# Patient Record
Sex: MALE | Race: WHITE | Employment: UNEMPLOYED | URBAN - METROPOLITAN AREA
[De-identification: names, ages, dates, MRNs, and addresses within clinical notes are randomized per-mention and may not be internally consistent; named-entity substitution may affect disease eponyms.]

---

## 2018-11-29 ENCOUNTER — OFFICE VISIT (OUTPATIENT)
Dept: FAMILY MEDICINE CLINIC | Age: 48
End: 2018-11-29

## 2018-11-29 VITALS
DIASTOLIC BLOOD PRESSURE: 89 MMHG | OXYGEN SATURATION: 98 % | HEIGHT: 67 IN | WEIGHT: 208 LBS | BODY MASS INDEX: 32.65 KG/M2 | RESPIRATION RATE: 18 BRPM | SYSTOLIC BLOOD PRESSURE: 144 MMHG | HEART RATE: 82 BPM | TEMPERATURE: 97.5 F

## 2018-11-29 DIAGNOSIS — R53.82 CHRONIC FATIGUE: ICD-10-CM

## 2018-11-29 DIAGNOSIS — E66.9 OBESITY (BMI 30-39.9): ICD-10-CM

## 2018-11-29 DIAGNOSIS — Z79.899 HIGH RISK MEDICATION USE: ICD-10-CM

## 2018-11-29 DIAGNOSIS — R10.13 EPIGASTRIC PAIN: ICD-10-CM

## 2018-11-29 DIAGNOSIS — Z23 ENCOUNTER FOR IMMUNIZATION: ICD-10-CM

## 2018-11-29 DIAGNOSIS — E78.41 ELEVATED LIPOPROTEIN(A): ICD-10-CM

## 2018-11-29 DIAGNOSIS — Z00.00 PHYSICAL EXAM: Primary | ICD-10-CM

## 2018-11-29 RX ORDER — TRAZODONE HYDROCHLORIDE 150 MG/1
150 TABLET ORAL
COMMUNITY

## 2018-11-29 NOTE — PATIENT INSTRUCTIONS
Body Mass Index: Care Instructions  Your Care Instructions    Body mass index (BMI) can help you see if your weight is raising your risk for health problems. It uses a formula to compare how much you weigh with how tall you are. · A BMI lower than 18.5 is considered underweight. · A BMI between 18.5 and 24.9 is considered healthy. · A BMI between 25 and 29.9 is considered overweight. A BMI of 30 or higher is considered obese. If your BMI is in the normal range, it means that you have a lower risk for weight-related health problems. If your BMI is in the overweight or obese range, you may be at increased risk for weight-related health problems, such as high blood pressure, heart disease, stroke, arthritis or joint pain, and diabetes. If your BMI is in the underweight range, you may be at increased risk for health problems such as fatigue, lower protection (immunity) against illness, muscle loss, bone loss, hair loss, and hormone problems. BMI is just one measure of your risk for weight-related health problems. You may be at higher risk for health problems if you are not active, you eat an unhealthy diet, or you drink too much alcohol or use tobacco products. Follow-up care is a key part of your treatment and safety. Be sure to make and go to all appointments, and call your doctor if you are having problems. It's also a good idea to know your test results and keep a list of the medicines you take. How can you care for yourself at home? · Practice healthy eating habits. This includes eating plenty of fruits, vegetables, whole grains, lean protein, and low-fat dairy. · If your doctor recommends it, get more exercise. Walking is a good choice. Bit by bit, increase the amount you walk every day. Try for at least 30 minutes on most days of the week. · Do not smoke. Smoking can increase your risk for health problems. If you need help quitting, talk to your doctor about stop-smoking programs and medicines. These can increase your chances of quitting for good. · Limit alcohol to 2 drinks a day for men and 1 drink a day for women. Too much alcohol can cause health problems. If you have a BMI higher than 25  · Your doctor may do other tests to check your risk for weight-related health problems. This may include measuring the distance around your waist. A waist measurement of more than 40 inches in men or 35 inches in women can increase the risk of weight-related health problems. · Talk with your doctor about steps you can take to stay healthy or improve your health. You may need to make lifestyle changes to lose weight and stay healthy, such as changing your diet and getting regular exercise. If you have a BMI lower than 18.5  · Your doctor may do other tests to check your risk for health problems. · Talk with your doctor about steps you can take to stay healthy or improve your health. You may need to make lifestyle changes to gain or maintain weight and stay healthy, such as getting more healthy foods in your diet and doing exercises to build muscle. Where can you learn more? Go to http://carly-avis.info/. Enter S176 in the search box to learn more about \"Body Mass Index: Care Instructions. \"  Current as of: October 13, 2016  Content Version: 11.4  © 6543-9217 [employer]. Care instructions adapted under license by [employer] (which disclaims liability or warranty for this information). If you have questions about a medical condition or this instruction, always ask your healthcare professional. [place] [address] any warranty or liability for your use of this information. Gastroesophageal Reflux Disease (GERD): Care Instructions  Your Care Instructions    Gastroesophageal reflux disease (GERD) is the backward flow of stomach acid into the esophagus. The esophagus is the tube that leads from your throat to your stomach.  A one-way valve prevents the stomach acid from moving up into this tube. When you have GERD, this valve does not close tightly enough. If you have mild GERD symptoms including heartburn, you may be able to control the problem with antacids or over-the-counter medicine. Changing your diet, losing weight, and making other lifestyle changes can also help reduce symptoms. Follow-up care is a key part of your treatment and safety. Be sure to make and go to all appointments, and call your doctor if you are having problems. It's also a good idea to know your test results and keep a list of the medicines you take. How can you care for yourself at home? · Take your medicines exactly as prescribed. Call your doctor if you think you are having a problem with your medicine. · Your doctor may recommend over-the-counter medicine. For mild or occasional indigestion, antacids, such as Tums, Gaviscon, Mylanta, or Maalox, may help. Your doctor also may recommend over-the-counter acid reducers, such as Pepcid AC, Tagamet HB, Zantac 75, or Prilosec. Read and follow all instructions on the label. If you use these medicines often, talk with your doctor. · Change your eating habits. ? It's best to eat several small meals instead of two or three large meals. ? After you eat, wait 2 to 3 hours before you lie down. ? Chocolate, mint, and alcohol can make GERD worse. ? Spicy foods, foods that have a lot of acid (like tomatoes and oranges), and coffee can make GERD symptoms worse in some people. If your symptoms are worse after you eat a certain food, you may want to stop eating that food to see if your symptoms get better. · Do not smoke or chew tobacco. Smoking can make GERD worse. If you need help quitting, talk to your doctor about stop-smoking programs and medicines. These can increase your chances of quitting for good.   · If you have GERD symptoms at night, raise the head of your bed 6 to 8 inches by putting the frame on blocks or placing a foam wedge under the head of your mattress. (Adding extra pillows does not work.)  · Do not wear tight clothing around your middle. · Lose weight if you need to. Losing just 5 to 10 pounds can help. When should you call for help? Call your doctor now or seek immediate medical care if:    · You have new or different belly pain.     · Your stools are black and tarlike or have streaks of blood.    Watch closely for changes in your health, and be sure to contact your doctor if:    · Your symptoms have not improved after 2 days.     · Food seems to catch in your throat or chest.   Where can you learn more? Go to http://carly-avis.info/. Enter S094 in the search box to learn more about \"Gastroesophageal Reflux Disease (GERD): Care Instructions. \"  Current as of: March 28, 2018  Content Version: 11.8  © 6071-9986 [employer]. Care instructions adapted under license by [employer] (which disclaims liability or warranty for this information). If you have questions about a medical condition or this instruction, always ask your healthcare professional. [address] any warranty or liability for your use of this information.

## 2018-11-29 NOTE — PROGRESS NOTES
Chief Complaint   Patient presents with   Royetta Perches Establish Care    Abdominal Pain     Epigastric pain for 2 weeks with diarrhea that lasted 1 week     1. Have you been to the ER, urgent care clinic since your last visit? Hospitalized since your last visit? No    2. Have you seen or consulted any other health care providers outside of the [address] [place] [address] since your last visit? Include any pap smears or colon screening. [facility]     After obtaining consent, and per orders of [facility], injection of flu vaccine 0.5mL given IM in right deltoid by Casey Diaz LPN. Patient instructed to remain in clinic for 20 minutes afterwards, and to report any adverse reaction to me immediately.

## 2018-11-29 NOTE — PROGRESS NOTES
HISTORY OF PRESENT ILLNESS  Js tSewart is a 53 y.o. male here today to establish care. Patient has past medical history of:  Past Medical History:   Diagnosis Date    Hypercholesterolemia     Paranoid schizophrenia ([address]     Psychiatric disorder     hearing voices     Paranoid schizophrenia:  [facility]- patient has been seeing them for 5 years, only recently started the invega injections. Patient is tolerating these medicatoins well. Insomnia- patient takes trazodone at night for sleep. Patient states that it works well for him-has only been taking for 1 month. Abdominal pain- 2 weeks ago- diarrhea x 1 day, resolved. Abdominal pain in the epigastric area- patient denies nausea, vomiting. Diet- patient has reduced dietary intake. Breakfast- oatmeal/grits, toas, lunch- sandwich- ham and cheese, dinner- meat with vegetables. Abdominal pain- intermittent, 8/10 at its worst- lasts for an hour, has tried pepto, immodium- didn't help abdominal pain, helped diarrhea. Last BM- normal, no melena, nothing makes the pain worse/better  Denies chest pain, shortness of breath, palpitations. Patient has never had this in the past.     Review of Systems   Constitutional: Negative for chills, diaphoresis, fever, malaise/fatigue and weight loss. HENT: Negative for congestion, ear pain, hearing loss, nosebleeds, sinus pain, sore throat and tinnitus. Eyes: Negative for blurred vision, double vision, pain and discharge. Respiratory: Negative for cough, hemoptysis, shortness of breath and wheezing. Cardiovascular: Negative for chest pain, palpitations, orthopnea, claudication, leg swelling and PND. Gastrointestinal: Positive for abdominal pain. Negative for blood in stool, constipation, diarrhea, heartburn, melena, nausea and vomiting. Genitourinary: Negative for dysuria, flank pain, frequency, hematuria and urgency.    Musculoskeletal: Negative for back pain, joint pain, myalgias and neck pain. Skin: Negative for itching and rash. Neurological: Negative for dizziness, tingling, tremors, speech change, focal weakness, seizures, weakness and headaches. Endo/Heme/Allergies: Negative for polydipsia. Does not bruise/bleed easily. Psychiatric/Behavioral: Positive for hallucinations. Negative for depression, memory loss, substance abuse and suicidal ideas. The patient is not nervous/anxious and does not have insomnia. Physical Exam   Constitutional: He is oriented to person, place, and time. He appears well-developed and well-nourished. HENT:   Head: Normocephalic and atraumatic. Right Ear: External ear normal.   Left Ear: External ear normal.   Mouth/Throat: Oropharynx is clear and moist.   Eyes: Conjunctivae are normal. Pupils are equal, round, and reactive to light. Neck: Normal range of motion. Neck supple. Cardiovascular: Normal rate, regular rhythm, normal heart sounds and intact distal pulses. Pulmonary/Chest: Effort normal and breath sounds normal.   Abdominal: Soft. Bowel sounds are normal. He exhibits no distension and no mass. There is no tenderness. There is no rebound and no guarding. Musculoskeletal: Normal range of motion. Neurological: He is alert and oriented to person, place, and time. Skin: Skin is warm and dry. Psychiatric: He has a normal mood and affect. His behavior is normal. Judgment and thought content normal.   Nursing note and vitals reviewed. ASSESSMENT and PLAN  Diagnoses and all orders for this visit:    1. Physical exam  -     CBC WITH AUTOMATED DIFF; Future  -     T4, FREE; Future  -     IRON PROFILE; Future  -     VITAMIN D, 1, 25 DIHYDROXY; Future    2. High risk medication use  -     AMB POC EKG ROUTINE W/ 12 LEADS, INTER & REP  -     CBC WITH AUTOMATED DIFF; Future  -     METABOLIC PANEL, COMPREHENSIVE; Future  -     TSH 3RD GENERATION; Future  -     VITAMIN B12; Future  -     IRON PROFILE; Future    3.  Encounter for immunization  -     INFLUENZA VIRUS VAC QUAD,SPLIT,PRESV FREE SYRINGE IM    4. Elevated lipoprotein(a)  -     LIPID PANEL; Future  -     TSH 3RD GENERATION; Future  -     VITAMIN B12; Future    5. Epigastric pain  -     IRON PROFILE; Future  -     VITAMIN D, 1, 25 DIHYDROXY; Future  -     H. PYLORI BREATH TEST; Future        -    Prilosec 20mg one cap po q day. - Advised patient to have his labs done prior to starting prilosec in order to get an accurate measurement of H. Pylori. 6. Chronic fatigue    7. Obesity (BMI 30-39. 9)    Discussed the patient's BMI with him. The BMI follow up plan is as follows:     dietary management education, guidance, and counseling  encourage exercise  monitor weight  prescribed dietary intake     An After Visit Summary was printed and given to the patient.

## 2018-12-03 ENCOUNTER — TELEPHONE (OUTPATIENT)
Dept: FAMILY MEDICINE CLINIC | Age: 48
End: 2018-12-03

## 2018-12-03 RX ORDER — PHENOL/SODIUM PHENOLATE
20 AEROSOL, SPRAY (ML) MUCOUS MEMBRANE DAILY
Qty: 30 TAB | Refills: 2 | Status: SHIPPED | OUTPATIENT
Start: 2018-12-03

## 2018-12-03 NOTE — TELEPHONE ENCOUNTER
Pt lvm that Rx request was not at pharmacy. Pt did not specify which Rx. Pt was last seen on 12/27/18.

## 2018-12-03 NOTE — TELEPHONE ENCOUNTER
Patient states that he needs RX for acid reflux sent to pharmacy as per his discussion with provider at his visit on 11/29/2018. Patient would like RX sent to [facility] in [place]. Routed to provider for review.

## 2018-12-03 NOTE — TELEPHONE ENCOUNTER
Patient informed that Prilosec was sent to pharmacy but to have labs done prior to starting medication. Patient verbalized understanding.

## 2018-12-03 NOTE — TELEPHONE ENCOUNTER
I sent in the Prilosec medication for the patient. Please let him know that he needs to have his labs done prior to starting this medication. Thanks much!

## 2018-12-06 LAB
1,25(OH)2D3 SERPL-MCNC: 39.8 PG/ML (ref 19.9–79.3)
ALBUMIN SERPL-MCNC: 4.6 G/DL (ref 3.5–5.5)
ALBUMIN/GLOB SERPL: 1.8 {RATIO} (ref 1.2–2.2)
ALP SERPL-CCNC: 48 IU/L (ref 39–117)
ALT SERPL-CCNC: 22 IU/L (ref 0–44)
AST SERPL-CCNC: 21 IU/L (ref 0–40)
BASOPHILS # BLD AUTO: 0.1 X10E3/UL (ref 0–0.2)
BASOPHILS NFR BLD AUTO: 1 %
BILIRUB SERPL-MCNC: 0.3 MG/DL (ref 0–1.2)
BUN SERPL-MCNC: 9 MG/DL (ref 6–24)
BUN/CREAT SERPL: 10 (ref 9–20)
CALCIUM SERPL-MCNC: 9.2 MG/DL (ref 8.7–10.2)
CHLORIDE SERPL-SCNC: 99 MMOL/L (ref 96–106)
CHOLEST SERPL-MCNC: 213 MG/DL (ref 100–199)
CO2 SERPL-SCNC: 24 MMOL/L (ref 20–29)
CREAT SERPL-MCNC: 0.92 MG/DL (ref 0.76–1.27)
EOSINOPHIL # BLD AUTO: 0.2 X10E3/UL (ref 0–0.4)
EOSINOPHIL NFR BLD AUTO: 2 %
ERYTHROCYTE [DISTWIDTH] IN BLOOD BY AUTOMATED COUNT: 13.4 % (ref 12.3–15.4)
GLOBULIN SER CALC-MCNC: 2.5 G/DL (ref 1.5–4.5)
GLUCOSE SERPL-MCNC: 98 MG/DL (ref 65–99)
HCT VFR BLD AUTO: 45.7 % (ref 37.5–51)
HDLC SERPL-MCNC: 29 MG/DL
HGB BLD-MCNC: 15.1 G/DL (ref 13–17.7)
IMM GRANULOCYTES # BLD: 0 X10E3/UL (ref 0–0.1)
IMM GRANULOCYTES NFR BLD: 0 %
IRON SATN MFR SERPL: 25 % (ref 15–55)
IRON SERPL-MCNC: 80 UG/DL (ref 38–169)
LDLC SERPL CALC-MCNC: 137 MG/DL (ref 0–99)
LYMPHOCYTES # BLD AUTO: 3.3 X10E3/UL (ref 0.7–3.1)
LYMPHOCYTES NFR BLD AUTO: 27 %
MCH RBC QN AUTO: 30.8 PG (ref 26.6–33)
MCHC RBC AUTO-ENTMCNC: 33 G/DL (ref 31.5–35.7)
MCV RBC AUTO: 93 FL (ref 79–97)
MONOCYTES # BLD AUTO: 0.6 X10E3/UL (ref 0.1–0.9)
MONOCYTES NFR BLD AUTO: 5 %
MORPHOLOGY BLD-IMP: ABNORMAL
NEUTROPHILS # BLD AUTO: 8.3 X10E3/UL (ref 1.4–7)
NEUTROPHILS NFR BLD AUTO: 65 %
PLATELET # BLD AUTO: 197 X10E3/UL (ref 150–379)
POTASSIUM SERPL-SCNC: 4.5 MMOL/L (ref 3.5–5.2)
PROT SERPL-MCNC: 7.1 G/DL (ref 6–8.5)
RBC # BLD AUTO: 4.9 X10E6/UL (ref 4.14–5.8)
SODIUM SERPL-SCNC: 141 MMOL/L (ref 134–144)
T4 FREE SERPL-MCNC: 1.33 NG/DL (ref 0.82–1.77)
TIBC SERPL-MCNC: 323 UG/DL (ref 250–450)
TRIGL SERPL-MCNC: 233 MG/DL (ref 0–149)
TSH SERPL DL<=0.005 MIU/L-ACNC: 2.4 UIU/ML (ref 0.45–4.5)
UIBC SERPL-MCNC: 243 UG/DL (ref 111–343)
VIT B12 SERPL-MCNC: 476 PG/ML (ref 232–1245)
VLDLC SERPL CALC-MCNC: 47 MG/DL (ref 5–40)
WBC # BLD AUTO: 12.5 X10E3/UL (ref 3.4–10.8)

## 2018-12-10 ENCOUNTER — TELEPHONE (OUTPATIENT)
Dept: FAMILY MEDICINE CLINIC | Age: 48
End: 2018-12-10

## 2018-12-10 NOTE — TELEPHONE ENCOUNTER
LVM for patient to call back in regards to lab results. Will order CXR and repeat CBC 2. To elevated WBC'S.

## 2018-12-11 ENCOUNTER — TELEPHONE (OUTPATIENT)
Dept: FAMILY MEDICINE CLINIC | Age: 48
End: 2018-12-11

## 2018-12-11 NOTE — TELEPHONE ENCOUNTER
Spoke with patient regarding abnormal labs. Patient's WBC count was mildly elevated. Patient states he feels well, no concerning symptoms. Advised patient that we will repeat his WBC in 2-3 weeks when I see him. Also, discussed with patient his elevated cholesterol levels. Recommended low fat diet and exercise to improve cholesterol. Patient verbalized understanding. Patient did no have h.pylori test done as the lab did not perform. Patient started taking omeprazole and his epigastric pain is improving. Recommend patient continue taking this medication. Will re-evaluate when he sees me in a few weeks.

## 2018-12-27 ENCOUNTER — OFFICE VISIT (OUTPATIENT)
Dept: FAMILY MEDICINE CLINIC | Age: 48
End: 2018-12-27

## 2018-12-27 VITALS
BODY MASS INDEX: 33.06 KG/M2 | SYSTOLIC BLOOD PRESSURE: 130 MMHG | HEART RATE: 90 BPM | DIASTOLIC BLOOD PRESSURE: 83 MMHG | RESPIRATION RATE: 18 BRPM | HEIGHT: 67 IN | TEMPERATURE: 96.4 F | WEIGHT: 210.6 LBS | OXYGEN SATURATION: 97 %

## 2018-12-27 DIAGNOSIS — D72.829 LEUKOCYTOSIS, UNSPECIFIED TYPE: ICD-10-CM

## 2018-12-27 DIAGNOSIS — Z71.6 ENCOUNTER FOR SMOKING CESSATION COUNSELING: ICD-10-CM

## 2018-12-27 DIAGNOSIS — E78.2 MIXED HYPERLIPIDEMIA: ICD-10-CM

## 2018-12-27 DIAGNOSIS — K21.9 GASTROESOPHAGEAL REFLUX DISEASE WITHOUT ESOPHAGITIS: Primary | ICD-10-CM

## 2018-12-27 NOTE — PROGRESS NOTES
Chief Complaint   Patient presents with    Abdominal Pain     Patient stated pain is better. 1. Have you been to the ER, urgent care clinic since your last visit? Hospitalized since your last visit? No    2. Have you seen or consulted any other health care providers outside of the [address] [place] [address] since your last visit? Include any pap smears or colon screening.  No

## 2018-12-27 NOTE — PROGRESS NOTES
HISTORY OF PRESENT ILLNESS  Seamus Chaves is a 53 y.o. male here today for abdominal pain follow-up. Patient has been taking omeprazole as prescribed. He states he feels much better after taking the medication for the past few weeks. Patient is still smoking a pack of cigarettes a day. Patient denies any further abdominal pain, chest pain, palpitations, n/v/d. Review of Systems   Respiratory: Negative for cough, hemoptysis, sputum production, shortness of breath and wheezing. Gastrointestinal: Negative for abdominal pain, blood in stool, constipation, diarrhea, heartburn, melena, nausea and vomiting. Physical Exam   Constitutional: He is oriented to person, place, and time. He appears well-developed and well-nourished. Cardiovascular: Normal rate, regular rhythm, normal heart sounds and intact distal pulses. Pulmonary/Chest: Effort normal and breath sounds normal. No respiratory distress. Abdominal: Soft. Bowel sounds are normal.   Neurological: He is alert and oriented to person, place, and time. Skin: Skin is warm and dry. Visit Vitals  /83   Pulse 90   Temp 96.4 °F (35.8 °C) (Oral)   Resp 18   Ht 5' 7\" (1.702 m)   Wt 210 lb 9.6 oz (95.5 kg)   SpO2 97% Comment: ra   BMI 32.98 kg/m²       Lab review:  Component Value Flag Ref Range Units Status   Cholesterol, total 213 Abnormally high   H  100 - 199 mg/dL Final   Triglyceride 233 Abnormally high   H  0 - 149 mg/dL Final   HDL Cholesterol 29 Abnormally low   L  >39 mg/dL Final   VLDL, calculated 47 Abnormally high   H  5 - 40 mg/dL Final   LDL, calculated 137 Abnormally high   H  0 - 99 mg/dL Final     WBC 12.5 Abnormally high   H  3.4 - 10.8 x10E3/uL Final     ABS. NEUTROPHILS 8.3 Abnormally high   H  1.4 - 7.0 x10E3/uL Final   Abs Lymphocytes 3.3 Abnormally high   H  0.7 - 3.1 x10E3/uL Final     ASSESSMENT and PLAN  Diagnoses and all orders for this visit:    1.  Gastroesophageal reflux disease without esophagitis       - Continue with omeprazole until complete.        - follow--up if s/s of epigastric pain return. Will refer to GI as necessary. 2. Leukocytosis, unspecified type  -     CBC WITH AUTOMATED DIFF; Future        - Repeat CBC in 1 month. 3. Mixed hyperlipidemia/Smoking Cessation  -     LIPID PANEL; Future  Repeat in 6 months. -     Explained to patient that his Invega injections could be contributing to his elevated lipids. He also endorses a poor diet and smokes 1ppd. Advised patient to quit smoking. Discussed various smoking cessation including medication and nicotine replacement. Patient says he will think about it and start working on reducing smoking. He will let us know if we can be of further assistance. - Consider statin medication to treat lipids if no improvement in lipid panel in 6 months. Follow-up in 6 months for lab review.

## 2019-01-26 LAB
ABSOLUTE LYMPHOCYTE COUNT, 10803: 3 K/UL (ref 1–4.8)
BASOPHILS # BLD: 0 K/UL (ref 0–0.2)
BASOPHILS NFR BLD: 0 % (ref 0–2)
CHOLEST SERPL-MCNC: 219 MG/DL (ref 110–200)
EOSINOPHIL # BLD: 0.3 K/UL (ref 0–0.5)
EOSINOPHIL NFR BLD: 3 % (ref 0–6)
ERYTHROCYTE [DISTWIDTH] IN BLOOD BY AUTOMATED COUNT: 14.2 % (ref 10–15.5)
GRANULOCYTES,GRANS: 67 % (ref 40–75)
HCT VFR BLD AUTO: 46.5 % (ref 39.3–51.6)
HDLC SERPL-MCNC: 23 MG/DL (ref 40–59)
HDLC SERPL-MCNC: 9.5 MG/DL (ref 0–5)
HGB BLD-MCNC: 14.7 G/DL (ref 13.1–17.2)
LDLC SERPL CALC-MCNC: 119 MG/DL (ref 50–99)
LYMPHOCYTES, LYMLT: 26 % (ref 20–45)
MCH RBC QN AUTO: 32 PG (ref 26–34)
MCHC RBC AUTO-ENTMCNC: 32 G/DL (ref 31–36)
MCV RBC AUTO: 100 FL (ref 80–95)
MONOCYTES # BLD: 0.5 K/UL (ref 0.1–1)
MONOCYTES NFR BLD: 5 % (ref 3–12)
NEUTROPHILS # BLD AUTO: 7.5 K/UL (ref 1.8–7.7)
PLATELET # BLD AUTO: 179 K/UL (ref 140–440)
PMV BLD AUTO: 12.1 FL (ref 9–13)
RBC # BLD AUTO: 4.67 M/UL (ref 3.8–5.8)
TRIGL SERPL-MCNC: 389 MG/DL (ref 40–149)
VLDLC SERPL CALC-MCNC: 78 MG/DL (ref 8–30)
WBC # BLD AUTO: 11.3 K/UL (ref 4–11)

## 2019-02-22 ENCOUNTER — TELEPHONE (OUTPATIENT)
Dept: FAMILY MEDICINE CLINIC | Age: 49
End: 2019-02-22

## 2019-02-22 DIAGNOSIS — D72.829 LEUKOCYTOSIS, UNSPECIFIED TYPE: ICD-10-CM

## 2019-02-22 DIAGNOSIS — Z79.899 ON STATIN THERAPY: ICD-10-CM

## 2019-02-22 DIAGNOSIS — E78.2 MIXED HYPERLIPIDEMIA: Primary | ICD-10-CM

## 2019-02-22 RX ORDER — ROSUVASTATIN CALCIUM 10 MG/1
10 TABLET, COATED ORAL
Qty: 90 TAB | Refills: 0 | Status: SHIPPED | OUTPATIENT
Start: 2019-02-22 | End: 2019-05-01 | Stop reason: SDUPTHER

## 2019-02-22 NOTE — TELEPHONE ENCOUNTER
Spoke with patient about his labs. Explained to patient that his cholesterol levels were elevated. He does endorse eating that morning, but the cholesterol was elevated the previous time. Recommend starting a cholesterol medication. Patient is amenable. Advised him to come back in 4-6 weeks for LFT's. Will call in medication and order labs. Patient is amenable to both and verbalized understanding. Elana Recinos PA-C  [facility]  [phone]  [address]

## 2019-04-05 ENCOUNTER — LAB ONLY (OUTPATIENT)
Dept: FAMILY MEDICINE CLINIC | Age: 49
End: 2019-04-05

## 2019-04-05 ENCOUNTER — HOSPITAL ENCOUNTER (OUTPATIENT)
Dept: LAB | Age: 49
Discharge: HOME | End: 2019-04-05
Payer: MEDICAID

## 2019-04-05 DIAGNOSIS — Z79.899 ON STATIN THERAPY: ICD-10-CM

## 2019-04-05 DIAGNOSIS — Z79.899 HIGH RISK MEDICATION USE: Primary | ICD-10-CM

## 2019-04-05 DIAGNOSIS — D72.829 LEUKOCYTOSIS, UNSPECIFIED TYPE: ICD-10-CM

## 2019-04-05 LAB
ALBUMIN SERPL-MCNC: 4.1 G/DL (ref 3.4–5)
ALBUMIN/GLOB SERPL: 1.3 {RATIO} (ref 0.8–1.7)
ALP SERPL-CCNC: 50 U/L (ref 45–117)
ALT SERPL-CCNC: 40 U/L (ref 16–61)
AST SERPL-CCNC: 24 U/L (ref 15–37)
BASOPHILS # BLD: 0.1 K/UL (ref 0–0.1)
BASOPHILS NFR BLD: 0 % (ref 0–2)
BILIRUB DIRECT SERPL-MCNC: 0.1 MG/DL (ref 0–0.2)
BILIRUB SERPL-MCNC: 0.4 MG/DL (ref 0.2–1)
DIFFERENTIAL METHOD BLD: NORMAL
EOSINOPHIL # BLD: 0.3 K/UL (ref 0–0.4)
EOSINOPHIL NFR BLD: 3 % (ref 0–5)
ERYTHROCYTE [DISTWIDTH] IN BLOOD BY AUTOMATED COUNT: 13.7 % (ref 11.6–14.5)
GLOBULIN SER CALC-MCNC: 3.1 G/DL (ref 2–4)
HCT VFR BLD AUTO: 45.2 % (ref 36–48)
HGB BLD-MCNC: 14.9 G/DL (ref 13–16)
LYMPHOCYTES # BLD: 2.9 K/UL (ref 0.9–3.6)
LYMPHOCYTES NFR BLD: 26 % (ref 21–52)
MCH RBC QN AUTO: 31 PG (ref 24–34)
MCHC RBC AUTO-ENTMCNC: 33 G/DL (ref 31–37)
MCV RBC AUTO: 94 FL (ref 74–97)
MONOCYTES # BLD: 0.8 K/UL (ref 0.05–1.2)
MONOCYTES NFR BLD: 8 % (ref 3–10)
NEUTS SEG # BLD: 7 K/UL (ref 1.8–8)
NEUTS SEG NFR BLD: 63 % (ref 40–73)
PLATELET # BLD AUTO: 178 K/UL (ref 135–420)
PMV BLD AUTO: 11.8 FL (ref 9.2–11.8)
PROT SERPL-MCNC: 7.2 G/DL (ref 6.4–8.2)
RBC # BLD AUTO: 4.81 M/UL (ref 4.7–5.5)
WBC # BLD AUTO: 11.1 K/UL (ref 4.6–13.2)

## 2019-04-05 PROCEDURE — 80076 HEPATIC FUNCTION PANEL: CPT

## 2019-04-05 PROCEDURE — 85025 COMPLETE CBC W/AUTO DIFF WBC: CPT

## 2019-04-05 NOTE — PROGRESS NOTES
Patient presents for lab draw ordered by:    Ordering Provider:  Barry Cobian Department/Practice:  [facility]  Phone:  [phone]  Date Ordered:  3/2019    The following labs were drawn and sent to [facility]  by Isabel Go LPN:    CBC and Hepatic Function Panel    The following tubes were sent:     1 SST, 1Lav, 0Blue top, 0urine    Left AC cleansed using aseptic technique. Specimen obtained using a 21 gauge butterfly  with 1 attempt. Patient tolerated process well and there was no bleeding noted at site.

## 2019-04-06 ENCOUNTER — TELEPHONE (OUTPATIENT)
Dept: FAMILY MEDICINE CLINIC | Age: 49
End: 2019-04-06

## 2019-04-09 ENCOUNTER — TELEPHONE (OUTPATIENT)
Dept: FAMILY MEDICINE CLINIC | Age: 49
End: 2019-04-09

## 2019-05-01 DIAGNOSIS — E78.2 MIXED HYPERLIPIDEMIA: ICD-10-CM

## 2019-05-03 NOTE — TELEPHONE ENCOUNTER
Requested Prescriptions     Pending Prescriptions Disp Refills    rosuvastatin (CRESTOR) 10 mg tablet 90 Tab 0     Sig: Take 1 Tab by mouth nightly.

## 2019-05-05 RX ORDER — ROSUVASTATIN CALCIUM 10 MG/1
10 TABLET, COATED ORAL
Qty: 90 TAB | Refills: 0 | Status: SHIPPED | OUTPATIENT
Start: 2019-05-05 | End: 2019-08-15 | Stop reason: SDUPTHER

## 2019-06-03 ENCOUNTER — OFFICE VISIT (OUTPATIENT)
Dept: FAMILY MEDICINE CLINIC | Age: 49
End: 2019-06-03

## 2019-06-03 VITALS
RESPIRATION RATE: 16 BRPM | HEIGHT: 67 IN | HEART RATE: 82 BPM | BODY MASS INDEX: 32.65 KG/M2 | TEMPERATURE: 96.3 F | OXYGEN SATURATION: 94 % | SYSTOLIC BLOOD PRESSURE: 113 MMHG | WEIGHT: 208 LBS | DIASTOLIC BLOOD PRESSURE: 75 MMHG

## 2019-06-03 DIAGNOSIS — Z79.899 ON STATIN THERAPY: ICD-10-CM

## 2019-06-03 DIAGNOSIS — E66.9 OBESITY (BMI 30-39.9): ICD-10-CM

## 2019-06-03 DIAGNOSIS — K21.9 GASTROESOPHAGEAL REFLUX DISEASE WITHOUT ESOPHAGITIS: ICD-10-CM

## 2019-06-03 DIAGNOSIS — E78.2 MIXED HYPERLIPIDEMIA: Primary | ICD-10-CM

## 2019-06-03 DIAGNOSIS — D72.829 LEUKOCYTOSIS, UNSPECIFIED TYPE: ICD-10-CM

## 2019-06-03 RX ORDER — BENZTROPINE MESYLATE 0.5 MG/1
1 TABLET ORAL
COMMUNITY

## 2019-06-03 NOTE — PROGRESS NOTES
Chief Complaint   Patient presents with    Results     labs     1. Have you been to the ER, urgent care clinic since your last visit? Hospitalized since your last visit? [facility] - [facility] infection    2. Have you seen or consulted any other health care providers outside of the [address] [place] [address] since your last visit? Include any pap smears or colon screening.  [facility]

## 2019-06-03 NOTE — PATIENT INSTRUCTIONS

## 2019-06-03 NOTE — PROGRESS NOTES
Follow Up Visit Note    Chief Complaint   Patient presents with    Results     labs       HPI:  Prudencio Pierre is a 51 y.o.  male  has a past medical history of Hypercholesterolemia, Paranoid schizophrenia ([address] and Psychiatric disorder. is here for the above complaint(s). Tobacco Use Disorder  Patient continues to smoke, but he has decreased his smoking. He is now rolling his own cigarettes now. He has decreased his smoking though. Hyperlipidemia  Patient has been taking crestor and is tolerating this medication well. He states he has had no myalgias or difficulty taking medication. His recent LFT's were WNL. He has been adjusting his diet as well and trying to snack less. He has lost a few pounds since last visit. Wt Readings from Last 3 Encounters:   06/03/19 208 lb (94.3 kg)   12/27/18 210 lb 9.6 oz (95.5 kg)   11/29/18 208 lb (94.3 kg)     GERD  Patient states that he has had no further issues with GERD symptoms. He took the omeprazole for 90 days and discontinued and has had no further pain in his abdomen. He denies any n/v/d, or heartburn symptoms. Denies headache, lightheadedness, dizziness, vision changes, chest pain at rest or with exertion, rapid/irregular heart rate, shortness of breath at rest or with exertion, cough, abdominal pain, leg swelling, leg pain. Review of Systems   Constitutional: Negative for chills, fever, malaise/fatigue and weight loss. Eyes: Negative for blurred vision, double vision and pain. Respiratory: Negative for cough, hemoptysis, sputum production, shortness of breath and wheezing. Cardiovascular: Negative for chest pain, palpitations, orthopnea, claudication and leg swelling. Gastrointestinal: Negative for abdominal pain, constipation, diarrhea, nausea and vomiting. Genitourinary: Negative for dysuria, frequency and urgency. Neurological: Negative for dizziness, tingling and headaches.    Psychiatric/Behavioral:        +tobacco abuse           Current Outpatient Medications   Medication Sig    benztropine (COGENTIN) 0.5 mg tablet Take 1 mg by mouth nightly.  paliperidone palmitate (INVEGA TRINZA) 546 mg/1.75 mL injection 546 mg by IntraMUSCular route every three (3) months.  traZODone (DESYREL) 150 mg tablet Take 150 mg by mouth nightly.  rosuvastatin (CRESTOR) 10 mg tablet Take 1 Tab by mouth nightly.  Omeprazole delayed release (PRILOSEC D/R) 20 mg tablet Take 1 Tab by mouth daily. No current facility-administered medications for this visit. Health Maintenance   Topic Date Due    Pneumococcal 0-64 years (1 of 1 - PPSV23) 02/04/1977    DTaP/Tdap/Td series (1 - Tdap) 02/04/1992    Influenza Age 5 to Adult  08/01/2019     Immunization History   Administered Date(s) Administered    Influenza Vaccine (Quad) PF 11/29/2018       Allergies and Medications: Reviewed and updated in EMR. Past Medical History:   Diagnosis Date    Hypercholesterolemia     Paranoid schizophrenia ([facility] [address]     Psychiatric disorder     hearing voices       Surgical History: Reviewed and updated in EMR as appropriate. Social History: Reviewed and updated in EMR as appropriate. Family History: Reviewed and updated in EMR as appropriate. OBJECTIVE:   Visit Vitals  /75   Pulse 82   Temp 96.3 °F (35.7 °C) (Oral)   Resp 16   Ht 5' 7\" (1.702 m)   Wt 208 lb (94.3 kg)   SpO2 94%   BMI 32.58 kg/m²        Physical Exam   Constitutional: He is oriented to person, place, and time and well-developed, well-nourished, and in no distress. No distress. Neck: Normal range of motion. Neck supple. No thyromegaly present. Cardiovascular: Normal rate, regular rhythm, normal heart sounds and intact distal pulses. Exam reveals no gallop and no friction rub. No murmur heard. Pulmonary/Chest: Effort normal and breath sounds normal. No respiratory distress. He has no wheezes. He has no rales. He exhibits no tenderness.    Lymphadenopathy:     He has no cervical adenopathy. Neurological: He is alert and oriented to person, place, and time. Skin: Skin is warm and dry. He is not diaphoretic. Psychiatric: Mood, memory, affect and judgment normal.   Nursing note and vitals reviewed. LABS/RADIOLOGICAL TESTS:  Lab Results   Component Value Date/Time    WBC 11.1 04/05/2019 08:56 AM    HGB 14.9 04/05/2019 08:56 AM    HCT 45.2 04/05/2019 08:56 AM    PLATELET 365 06/73/1889 08:56 AM     Lab Results   Component Value Date/Time    Sodium 141 12/05/2018 09:05 AM    Potassium 4.5 12/05/2018 09:05 AM    Chloride 99 12/05/2018 09:05 AM    CO2 24 12/05/2018 09:05 AM    Glucose 98 12/05/2018 09:05 AM    BUN 9 12/05/2018 09:05 AM    Creatinine 0.92 12/05/2018 09:05 AM     Lab Results   Component Value Date/Time    Cholesterol, total 219 (H) 01/25/2019 09:23 AM    HDL Cholesterol 23 (L) 01/25/2019 09:23 AM    LDL, calculated 119 (H) 01/25/2019 09:23 AM    Triglyceride 389 (H) 01/25/2019 09:23 AM     No results found for: GPT  No results found for: HBA1C, HGBE8, RQG4LDRI, CFX2RMUJ      All lab results and radiological studies were reviewed and discussed with the patient. ASSESSMENT/PLAN:    Diagnoses and all orders for this visit:    1. Mixed hyperlipidemia/Obesity  Continue with current therapy. Key CAD CHF Meds             rosuvastatin (CRESTOR) 10 mg tablet Take 1 Tab by mouth nightly. Dietary instructions given to patient to reduce cholesterol. The patient is asked to make an attempt to improve diet and exercise patterns to aid in medical management of this problem. 2. On statin therapy  Continue to monitor LFT's. Notify provider if he begins to have any myalgias. 3. Leukocytosis, unspecified type  Resolved  4. Gastroesophageal reflux disease without esophagitis  Resolved with omeprazole. Continue to monitor. Patient is due for follow-up and immunizations, will schedule 3 month PE.         Requested Prescriptions      No prescriptions requested or ordered in this encounter     Patient verbalized understanding and agreement with the plan. Patient was given an after-visit summary. Follow-up in 3 MONTHS or sooner if worsening symptoms. More than 50% of this 25 min visit was spent counseling the patient face to face about etiology and treatment of health conditions outlined in assessment and plan        Elana Recinos PA-C  [address], [address] [place] [address] [phone]  [place] [phone]

## 2019-08-15 DIAGNOSIS — E78.2 MIXED HYPERLIPIDEMIA: ICD-10-CM

## 2019-08-15 NOTE — TELEPHONE ENCOUNTER
Per fax,   Requested Prescriptions     Pending Prescriptions Disp Refills    rosuvastatin (CRESTOR) 10 mg tablet 90 Tab 0     Sig: Take 1 Tab by mouth nightly.

## 2019-08-20 RX ORDER — ROSUVASTATIN CALCIUM 10 MG/1
10 TABLET, COATED ORAL
Qty: 90 TAB | Refills: 0 | Status: SHIPPED | OUTPATIENT
Start: 2019-08-20 | End: 2019-09-10 | Stop reason: SDUPTHER

## 2019-09-10 ENCOUNTER — OFFICE VISIT (OUTPATIENT)
Dept: FAMILY MEDICINE CLINIC | Age: 49
End: 2019-09-10

## 2019-09-10 VITALS
TEMPERATURE: 96.2 F | SYSTOLIC BLOOD PRESSURE: 128 MMHG | BODY MASS INDEX: 31.55 KG/M2 | RESPIRATION RATE: 18 BRPM | HEIGHT: 67 IN | WEIGHT: 201 LBS | HEART RATE: 85 BPM | OXYGEN SATURATION: 97 % | DIASTOLIC BLOOD PRESSURE: 77 MMHG

## 2019-09-10 DIAGNOSIS — Z72.0 TOBACCO ABUSE: Primary | ICD-10-CM

## 2019-09-10 DIAGNOSIS — F17.218 CIGARETTE NICOTINE DEPENDENCE WITH OTHER NICOTINE-INDUCED DISORDER: ICD-10-CM

## 2019-09-10 DIAGNOSIS — E78.2 MIXED HYPERLIPIDEMIA: ICD-10-CM

## 2019-09-10 RX ORDER — IBUPROFEN 200 MG
1 TABLET ORAL EVERY 24 HOURS
Qty: 30 PATCH | Refills: 0 | Status: SHIPPED | OUTPATIENT
Start: 2019-09-10 | End: 2019-10-10

## 2019-09-10 RX ORDER — ROSUVASTATIN CALCIUM 10 MG/1
10 TABLET, COATED ORAL
Qty: 90 TAB | Refills: 0 | Status: SHIPPED | OUTPATIENT
Start: 2019-09-10

## 2019-09-10 RX ORDER — NICOTINE 7MG/24HR
1 PATCH, TRANSDERMAL 24 HOURS TRANSDERMAL EVERY 24 HOURS
Qty: 30 PATCH | Refills: 0 | Status: SHIPPED | OUTPATIENT
Start: 2019-09-10 | End: 2019-10-10

## 2019-09-10 NOTE — PROGRESS NOTES
Follow Up Visit Note    Chief Complaint   Patient presents with    Physical       HPI:  France Calderón is a 51 y.o.  male  has a past medical history of Hypercholesterolemia, Paranoid schizophrenia ([address] and Psychiatric disorder. is here for the above complaint(s). Tobacco Abuse  Patient states that he has been smoking for 30 years, about 1.5 packs per day. Patient is interested in smoking cessation. Patient does have some shortness of breath and cough in the morning that is non-productive. Patient denies any wheezing. Elevated Lipids    Key CAD CHF Meds             rosuvastatin (CRESTOR) 10 mg tablet (Taking) Take 1 Tab by mouth nightly. Lab Results   Component Value Date/Time    Cholesterol, total 219 (H) 01/25/2019 09:23 AM    HDL Cholesterol 23 (L) 01/25/2019 09:23 AM    LDL, calculated 119 (H) 01/25/2019 09:23 AM    VLDL, calculated 78 (H) 01/25/2019 09:23 AM    Triglyceride 389 (H) 01/25/2019 09:23 AM         Obesity    Wt Readings from Last 5 Encounters:   09/10/19 201 lb (91.2 kg)   06/03/19 208 lb (94.3 kg)   12/27/18 210 lb 9.6 oz (95.5 kg)   11/29/18 208 lb (94.3 kg)   08/14/12 180 lb (81.6 kg)         Review of Systems   Constitutional: Negative for chills, fever, malaise/fatigue and weight loss. Eyes: Negative for blurred vision, double vision and pain. Respiratory: Negative for cough, sputum production, shortness of breath and wheezing. Cardiovascular: Negative for chest pain, palpitations, orthopnea, claudication and leg swelling. Gastrointestinal: Negative for abdominal pain, constipation, diarrhea, nausea and vomiting. Genitourinary: Negative for dysuria, frequency and urgency. Neurological: Negative for dizziness, tingling and headaches. Current Outpatient Medications   Medication Sig    rosuvastatin (CRESTOR) 10 mg tablet Take 1 Tab by mouth nightly.  benztropine (COGENTIN) 0.5 mg tablet Take 1 mg by mouth nightly.     paliperidone palmitate (INVEGA TRINZA) 546 mg/1.75 mL injection 546 mg by IntraMUSCular route every three (3) months.  traZODone (DESYREL) 150 mg tablet Take 150 mg by mouth nightly.  Omeprazole delayed release (PRILOSEC D/R) 20 mg tablet Take 1 Tab by mouth daily. No current facility-administered medications for this visit. Health Maintenance   Topic Date Due    Pneumococcal 0-64 years (1 of 1 - PPSV23) 02/04/1977    DTaP/Tdap/Td series (1 - Tdap) 02/04/1992    MEDICARE YEARLY EXAM  06/03/2019    Influenza Age 9 to Adult  08/01/2019     Immunization History   Administered Date(s) Administered    Influenza Vaccine (Quad) PF 11/29/2018       Allergies and Medications: Reviewed and updated in EMR. Past Medical History:   Diagnosis Date    Hypercholesterolemia     Paranoid schizophrenia ([facility] [address]     Psychiatric disorder     hearing voices       Surgical History: Reviewed and updated in EMR as appropriate. Social History: Reviewed and updated in EMR as appropriate. Family History: Reviewed and updated in EMR as appropriate. OBJECTIVE:   Visit Vitals  /77   Pulse 85   Temp 96.2 °F (35.7 °C) (Oral)   Resp 18   Ht 5' 7\" (1.702 m)   Wt 201 lb (91.2 kg)   SpO2 97%   BMI 31.48 kg/m²        Physical Exam   Constitutional: He is oriented to person, place, and time and well-developed, well-nourished, and in no distress. No distress. Neck: Normal range of motion. Neck supple. No thyromegaly present. Cardiovascular: Normal rate, regular rhythm, normal heart sounds and intact distal pulses. Exam reveals no gallop and no friction rub. No murmur heard. Pulmonary/Chest: Effort normal and breath sounds normal. No respiratory distress. He has no wheezes. He has no rales. He exhibits no tenderness. Lymphadenopathy:     He has no cervical adenopathy. Neurological: He is alert and oriented to person, place, and time. Skin: Skin is warm and dry. He is not diaphoretic.    Psychiatric: Mood, memory, affect and judgment normal.   Nursing note and vitals reviewed. LABS/RADIOLOGICAL TESTS:  Lab Results   Component Value Date/Time    WBC 11.1 04/05/2019 08:56 AM    HGB 14.9 04/05/2019 08:56 AM    HCT 45.2 04/05/2019 08:56 AM    PLATELET 122 34/99/2323 08:56 AM     Lab Results   Component Value Date/Time    Sodium 141 12/05/2018 09:05 AM    Potassium 4.5 12/05/2018 09:05 AM    Chloride 99 12/05/2018 09:05 AM    CO2 24 12/05/2018 09:05 AM    Glucose 98 12/05/2018 09:05 AM    BUN 9 12/05/2018 09:05 AM    Creatinine 0.92 12/05/2018 09:05 AM     Lab Results   Component Value Date/Time    Cholesterol, total 219 (H) 01/25/2019 09:23 AM    HDL Cholesterol 23 (L) 01/25/2019 09:23 AM    LDL, calculated 119 (H) 01/25/2019 09:23 AM    Triglyceride 389 (H) 01/25/2019 09:23 AM     No results found for: GPT  No results found for: HBA1C, HGBE8, IZJ2JTHI, TQN2LATP      All lab results and radiological studies were reviewed and discussed with the patient. ASSESSMENT/PLAN:    Diagnoses and all orders for this visit:    1. Tobacco abuse  -     nicotine (NICODERM CQ) 21 mg/24 hr; 1 Patch by TransDERmal route every twenty-four (24) hours for 30 days. -     nicotine (NICODERM CQ) 14 mg/24 hr patch; 1 Patch by TransDERmal route every twenty-four (24) hours for 30 days. -     nicotine (NICODERM CQ) 7 mg/24 hr; 1 Patch by TransDERmal route every twenty-four (24) hours for 30 days. The patient was counseled on the dangers of tobacco use, and was advised to quit. Reviewed strategies to maximize success, including removing cigarettes and smoking materials from environment and pharmacotherapy (nicotine patches). 2. Mixed hyperlipidemia  -     rosuvastatin (CRESTOR) 10 mg tablet; Take 1 Tab by mouth nightly. -     LIPID PANEL; Future  -     HEPATIC FUNCTION PANEL; Future    3. Cigarette nicotine dependence with other nicotine-induced disorder  AS ABOVE        ICD-10-CM ICD-9-CM    1.  Mixed hyperlipidemia E78.2 272.2 rosuvastatin (CRESTOR) 10 mg tablet       Requested Prescriptions     Signed Prescriptions Disp Refills    rosuvastatin (CRESTOR) 10 mg tablet 90 Tab 0     Sig: Take 1 Tab by mouth nightly. Patient verbalized understanding and agreement with the plan. Patient was given an after-visit summary. Follow-up in 3 months for [facility] or sooner if worsening symptoms. More than 50% of this 25 min visit was spent counseling the patient face to face about etiology and treatment of health conditions outlined in assessment and plan        Elana Recinos PA-C  [address], [address] [place] [address], [address] [place] [phone]  [facility] [phone]

## 2019-09-10 NOTE — PROGRESS NOTES
Chief Complaint   Patient presents with    Physical     1. Have you been to the ER, urgent care clinic since your last visit? Hospitalized since your last visit? No    2. Have you seen or consulted any other health care providers outside of the [address] [place] [address] since your last visit? Include any pap smears or colon screening.  No

## 2019-10-05 LAB
CHOLEST SERPL-MCNC: 149 MG/DL (ref 100–199)
HDLC SERPL-MCNC: 33 MG/DL
LDLC SERPL CALC-MCNC: 90 MG/DL (ref 0–99)
TRIGL SERPL-MCNC: 128 MG/DL (ref 0–149)
VLDLC SERPL CALC-MCNC: 26 MG/DL (ref 5–40)

## 2019-12-10 ENCOUNTER — OFFICE VISIT (OUTPATIENT)
Dept: FAMILY MEDICINE CLINIC | Age: 49
End: 2019-12-10

## 2019-12-10 VITALS
WEIGHT: 198 LBS | HEART RATE: 71 BPM | OXYGEN SATURATION: 95 % | DIASTOLIC BLOOD PRESSURE: 75 MMHG | SYSTOLIC BLOOD PRESSURE: 118 MMHG | TEMPERATURE: 96.7 F | HEIGHT: 67 IN | BODY MASS INDEX: 31.08 KG/M2 | RESPIRATION RATE: 18 BRPM

## 2019-12-10 DIAGNOSIS — Z00.00 WELCOME TO MEDICARE PREVENTIVE VISIT: Primary | ICD-10-CM

## 2019-12-10 DIAGNOSIS — B00.9 HSV-1 INFECTION: ICD-10-CM

## 2019-12-10 RX ORDER — VALACYCLOVIR HYDROCHLORIDE 1 G/1
TABLET, FILM COATED ORAL
Qty: 4 TAB | Refills: 2 | Status: SHIPPED | OUTPATIENT
Start: 2019-12-10

## 2019-12-10 RX ORDER — DOCOSANOL 100 MG/G
CREAM TOPICAL
Qty: 2 G | Refills: 0 | Status: SHIPPED | OUTPATIENT
Start: 2019-12-10

## 2019-12-10 NOTE — PROGRESS NOTES
(AWV) The Initial Medicare Annual Wellness Exam PROGRESS NOTE    This is an Initial Medicare Annual Wellness Exam (AWV) (Performed 12 months after IPPE or effective date of Medicare Part B enrollment, Once in a lifetime)    I have reviewed the patient's medical history in detail and updated the computerized patient record. Tucker Paul is a 51 y.o.  male and presents for an annual wellness exam       There are no active problems to display for this patient. Current Outpatient Medications   Medication Sig Dispense Refill    rosuvastatin (CRESTOR) 10 mg tablet Take 1 Tab by mouth nightly. 90 Tab 0    benztropine (COGENTIN) 0.5 mg tablet Take 1 mg by mouth nightly.  paliperidone palmitate (INVEGA TRINZA) 546 mg/1.75 mL injection 546 mg by IntraMUSCular route every three (3) months.  traZODone (DESYREL) 150 mg tablet Take 150 mg by mouth nightly.  Omeprazole delayed release (PRILOSEC D/R) 20 mg tablet Take 1 Tab by mouth daily. 30 Tab 2     No Known Allergies  Past Medical History:   Diagnosis Date    Hypercholesterolemia     Paran[facility] ([facility] [address]     Psychiatric disorder     hearing voices     No past surgical history on file. Family History   Problem Relation Age of Onset    Eczema Mother     No Known Problems Father      Social History     Tobacco Use    Smoking status: Current Every Day Smoker     Packs/day: 1.00     Years: 30.00     Pack years: 30.00    Smokeless tobacco: Never Used   Substance Use Topics    Alcohol use: Yes     Alcohol/week: 3.0 standard drinks     Types: 3 Cans of beer per week     Frequency: Monthly or less     Drinks per session: 3 or 4     Comment: 3-4 times per month     Review of Systems   Constitutional: Negative for chills, fever, malaise/fatigue and weight loss. HENT: Negative for congestion, ear pain, sinus pain and sore throat. Eyes: Negative for blurred vision, double vision, pain and redness.    Respiratory: Negative for cough, shortness of breath, wheezing and stridor. Cardiovascular: Negative for chest pain, palpitations, claudication and leg swelling. Gastrointestinal: Negative for abdominal pain, constipation, diarrhea, heartburn, nausea and vomiting. Genitourinary: Negative for dysuria, frequency, hematuria and urgency. Musculoskeletal: Negative for back pain, joint pain and myalgias. Skin: Positive for rash. Negative for itching. Mouth rash     Neurological: Negative for dizziness, tingling and headaches. Psychiatric/Behavioral: Negative for depression and suicidal ideas. The patient is not nervous/anxious. All other systems reviewed and are negative. History     Past Medical History:   Diagnosis Date    Hypercholesterolemia     [facility]oid [facility] ([facility] [address]     Psychiatric disorder     hearing voices      No past surgical history on file. Current Outpatient Medications   Medication Sig Dispense Refill    rosuvastatin (CRESTOR) 10 mg tablet Take 1 Tab by mouth nightly. 90 Tab 0    benztropine (COGENTIN) 0.5 mg tablet Take 1 mg by mouth nightly.  paliperidone palmitate (INVEGA TRINZA) 546 mg/1.75 mL injection 546 mg by IntraMUSCular route every three (3) months.  traZODone (DESYREL) 150 mg tablet Take 150 mg by mouth nightly.  Omeprazole delayed release (PRILOSEC D/R) 20 mg tablet Take 1 Tab by mouth daily. 30 Tab 2     No Known Allergies  Family History   Problem Relation Age of Onset    Eczema Mother     No Known Problems Father      Social History     Tobacco Use    Smoking status: Current Every Day Smoker     Packs/day: 1.00     Years: 30.00     Pack years: 30.00    Smokeless tobacco: Never Used   Substance Use Topics    Alcohol use: Yes     Alcohol/week: 3.0 standard drinks     Types: 3 Cans of beer per week     Frequency: Monthly or less     Drinks per session: 3 or 4     Comment: 3-4 times per month     There is no problem list on file for this patient.       Health Maintenance History  Immunizations reviewed, dtap 10/2019 done at [facility] ,flu10/2019- [facility]  Colonoscopy: screen at 49, AAA screening n/a (male over 72 smoker)  Chest CT: doesn't qualify yet- has to be over 54  Eye exam: 2019      Depression Risk Factor Screening:      Patient Health Questionnaire (PHQ-2)   Over the last 2 weeks, how often have you been bothered by any of the following problems? · Little interest or pleasure in doing things? · Not at all. [0]  · Feeling down, depressed, or hopeless? · Not at all. [0]    Total Score: 0/6  PHQ-2 Assessment Scoring:   A score of 2 or more requires further screening with the PHQ-9  Patient sees psychiatry every 3 months for management of schizophrenia. Alcohol Risk Factor Screening:     Women: On any occasion during the past 3 months, have you had more than 3 drinks containing alcohol? Do you average more than 7 drinks per week? Men: On any occasion during the past 3 months, have you had more than 4 drinks containing alcohol? no  Do you average more than 14 drinks per week? no    Functional Ability and Level of Safety:     Hearing Loss    Hearing is good. Activities of Daily Living   Self-care. Requires assistance with: no ADLs    Fall Risk   No fall risk factors    Abuse Screen   Patient is not abused    Examination   Physical Examination  Vitals:    12/10/19 0932   BP: 118/75   Pulse: 71   Resp: 18   Temp: 96.7 °F (35.9 °C)   TempSrc: Oral   SpO2: 95%   Weight: 198 lb (89.8 kg)   Height: 5' 7\" (1.702 m)   PainSc:   0 - No pain      Body mass index is 31.01 kg/m². Evaluation of Cognitive Function:  Mood/affect:  flat  Appearance:  WNL  Family member/caregiver input:none    alert, well appearing, and in no distress, oriented to person, place, and time and overweight    Patient Care Team:  Annie Acevedo PA-C as PCP - General (Physician Assistant)  Annie Acevedo PA-C as PCP - [facility] Empaneled Provider    End-of-life planning  Advanced Directive in the case than an injury or illness causes the patient to be unable to make health care decisions    Health Care Directive or Living Will: no    Advice/Referrals/Counselling/Plan:   Education and counseling provided:  Are appropriate based on today's review and evaluation  EKG  Include in education list (weight loss, physical activity, smoking cessation, fall prevention, and nutrition)  Orders Placed This Encounter    AMB POC EKG ROUTINE W/ 12 LEADS, INTER & REP    valACYclovir (VALTREX) 1 gram tablet    docosanol (ABREVA) 10 % topical cream     Orders Placed This Encounter    AMB POC EKG ROUTINE W/ 12 LEADS, INTER & REP     Order Specific Question:   Reason for Exam:     Answer:   welcome to medicare    valACYclovir (VALTREX) 1 gram tablet     Si tabs twice a day x 1 day     Dispense:  4 Tab     Refill:  2    docosanol (ABREVA) 10 % topical cream     Sig: Apply  to affected area five (5) times daily. Dispense:  2 g     Refill:  0   .  Brief written plan, checklist    I have discussed the diagnosis with the patient and the intended plan as seen in the above orders. The patient has received an after-visit summary and questions were answered concerning future plans. I have discussed medication side effects and warnings with the patient as well. I have reviewed the plan of care with the patient, accepted their input and they are in agreement with the treatment goals. ____________________________________________________________    Problem Assessment    for treatment of   Chief Complaint   Patient presents with    Welcome To Medicare     Wt Readings from Last 3 Encounters:   12/10/19 198 lb (89.8 kg)   09/10/19 201 lb (91.2 kg)   19 208 lb (94.3 kg)         SUBJECTIVE    Upper lip blister/ulcer  Cardiovascular Review:  The patient has hyperlipidemia.   Diet and Lifestyle: generally follows a low fat low cholesterol diet  Home BP Monitoring: is not measured at home.  Pertinent ROS: taking medications as instructed, no medication side effects noted, no TIA's, no chest pain on exertion, no dyspnea on exertion, no swelling of ankles. Additional Concerns: Upper lip blister/ulcerations. H/o HSV. Patient states that he noticed a blister on upper lip a few weeks ago. Patient has had herpes simplex virus in the past. Patient was treated for this greater than 20 years ago. Visit Vitals  /75   Pulse 71   Temp 96.7 °F (35.9 °C) (Oral)   Resp 18   Ht 5' 7\" (1.702 m)   Wt 198 lb (89.8 kg)   SpO2 95%   BMI 31.01 kg/m²     Physical Exam  Vitals signs reviewed. Constitutional:       General: He is not in acute distress. Appearance: He is not diaphoretic. HENT:      Mouth/Throat:      Lips: Pink. Lesions present. Neck:      Musculoskeletal: Normal range of motion and neck supple. Thyroid: No thyromegaly. Cardiovascular:      Rate and Rhythm: Normal rate and regular rhythm. Heart sounds: Normal heart sounds. No murmur. No friction rub. No gallop. Pulmonary:      Effort: Pulmonary effort is normal. No respiratory distress. Breath sounds: Normal breath sounds. No wheezing or rales. Chest:      Chest wall: No tenderness. Lymphadenopathy:      Cervical: No cervical adenopathy. Skin:     General: Skin is warm and dry. Neurological:      Mental Status: He is alert and oriented to person, place, and time. Psychiatric:         Mood and Affect: Mood and affect normal.         Cognition and Memory: Memory normal.         Judgment: Judgment normal.           Assessment/Plan:      Hyperlipidemia - stable    Diagnoses and all orders for this visit:    1. Welcome to Medicare preventive visit  -     AMB POC EKG ROUTINE W/ 12 LEADS, INTER & REP  -     valACYclovir (VALTREX) 1 gram tablet; 2 tabs twice a day x 1 day  -     docosanol (ABREVA) 10 % topical cream; Apply  to affected area five (5) times daily.     2. HSV-1 infection  - valACYclovir (VALTREX) 1 gram tablet; 2 tabs twice a day x 1 day  -     docosanol (ABREVA) 10 % topical cream; Apply  to affected area five (5) times daily.           Lab review: labs are reviewed, up to date and normal

## 2019-12-10 NOTE — PROGRESS NOTES
Caryl Ellison presents today for   Chief Complaint   Patient presents with    Welcome To Medicare       Is someone accompanying this pt? no    Is the patient using any DME equipment during [address]? no    Depression Screening:  3 most recent PHQ Screens 12/10/2019   Little interest or pleasure in doing things Not at all   Feeling down, depressed, irritable, or hopeless Not at all   Total Score PHQ 2 0       Learning Assessment:  No flowsheet data found. Abuse Screening:  No flowsheet data found. Fall Risk  No flowsheet data found. Health Maintenance reviewed     Health Maintenance Due   Topic Date Due    Pneumococcal 0-64 years (1 of 1 - PPSV23) 02[birth year]04/1977    DTaP/Tdap/Td series (1 - Tdap) 02/04/1982    MEDICARE YEARLY EXAM  06/03/2019    Influenza Age 9 to Adult  08/01/2019   . Coordination of Care:  1. Have you been to the ER, urgent care clinic since your last visit? Hospitalized since your last visit? no    2. Have you seen or consulted any other health care providers outside of the [address] [place] [address] since your last visit? Include any pap smears or colon screening.  no      Last  Checked na  Last UDS Checked na  Last Pain contract signed: na

## 2019-12-12 ENCOUNTER — TELEPHONE (OUTPATIENT)
Dept: FAMILY MEDICINE CLINIC | Age: 49
End: 2019-12-12

## 2023-01-31 RX ORDER — OMEPRAZOLE 20 MG/1
20 TABLET, DELAYED RELEASE ORAL DAILY
COMMUNITY
Start: 2018-12-03

## 2023-01-31 RX ORDER — ROSUVASTATIN CALCIUM 10 MG/1
10 TABLET, COATED ORAL
COMMUNITY
Start: 2019-09-10

## 2023-01-31 RX ORDER — BENZTROPINE MESYLATE 0.5 MG/1
1 TABLET ORAL
COMMUNITY

## 2023-01-31 RX ORDER — DOCOSANOL 100 MG/G
CREAM TOPICAL
COMMUNITY
Start: 2019-12-10

## 2023-01-31 RX ORDER — VALACYCLOVIR HYDROCHLORIDE 1 G/1
TABLET, FILM COATED ORAL
COMMUNITY
Start: 2019-12-10

## 2023-01-31 RX ORDER — TRAZODONE HYDROCHLORIDE 150 MG/1
150 TABLET ORAL
COMMUNITY